# Patient Record
Sex: MALE | Race: WHITE | NOT HISPANIC OR LATINO | URBAN - NONMETROPOLITAN AREA
[De-identification: names, ages, dates, MRNs, and addresses within clinical notes are randomized per-mention and may not be internally consistent; named-entity substitution may affect disease eponyms.]

---

## 2017-06-01 PROBLEM — Z46.0: Noted: 2018-05-01

## 2017-06-01 PROBLEM — H52.13: Noted: 2017-06-01

## 2017-06-01 PROBLEM — H25.13: Noted: 2017-06-01

## 2019-05-23 ENCOUNTER — IMPORTED ENCOUNTER (OUTPATIENT)
Dept: URBAN - NONMETROPOLITAN AREA CLINIC 1 | Facility: CLINIC | Age: 39
End: 2019-05-23

## 2019-05-23 PROCEDURE — 92015 DETERMINE REFRACTIVE STATE: CPT

## 2019-05-23 PROCEDURE — 92310 CONTACT LENS FITTING OU: CPT

## 2019-05-23 PROCEDURE — 92014 COMPRE OPH EXAM EST PT 1/>: CPT

## 2019-05-23 NOTE — PATIENT DISCUSSION
L wear-CLs fit and center well.-Stressed that patient should not sleep in CL. -Updated CL Rx given. -CL care and precautions given. Myopia-Discussed diagnosis with patient. -Explained that people who are myopic are at a higher risk for developing RD/RT and reviewed associated S&S.-Pt to contact our office if symptoms develop. Old horseshoe tears OD x2-stable monitor-Retina flat 360 with no new breaks tears or heme.-S&S of RD/RT reviewed with pt.-Stressed that pt should contact our office right away with any changes or increase in symptoms. Early Cats OU -stable monitorRTC 1 YR CEE; 's Notes: no new doctor

## 2020-07-08 ENCOUNTER — IMPORTED ENCOUNTER (OUTPATIENT)
Dept: URBAN - NONMETROPOLITAN AREA CLINIC 1 | Facility: CLINIC | Age: 40
End: 2020-07-08

## 2020-07-08 PROBLEM — H52.13: Noted: 2020-07-08

## 2020-07-08 PROBLEM — H25.13: Noted: 2017-06-01

## 2020-07-08 PROBLEM — H25.13: Noted: 2021-07-22

## 2020-07-08 PROBLEM — Z46.0: Noted: 2020-07-08

## 2020-07-08 PROCEDURE — 92015 DETERMINE REFRACTIVE STATE: CPT

## 2020-07-08 PROCEDURE — 92310 CONTACT LENS FITTING OU: CPT

## 2020-07-08 PROCEDURE — 92014 COMPRE OPH EXAM EST PT 1/>: CPT

## 2020-07-08 NOTE — PATIENT DISCUSSION
L wear-CLs fit and center well.-Stressed that patient should not sleep in CL. -Pt given trial -1.50 OU -CL care and precautions given. Myopia-Discussed diagnosis with patient. -Explained that people who are myopic are at a higher risk for developing RD/RT and reviewed associated S&S.-Pt to contact our office if symptoms develop. Old horseshoe tears OD x2-stable monitor-Retina flat 360 with no new breaks tears or heme.-S&S of RD/RT reviewed with pt.-Stressed that pt should contact our office right away with any changes or increase in symptoms. Early Cats OU -stable monitorRTC 1 YR CEE

## 2021-07-22 ENCOUNTER — IMPORTED ENCOUNTER (OUTPATIENT)
Dept: URBAN - NONMETROPOLITAN AREA CLINIC 1 | Facility: CLINIC | Age: 41
End: 2021-07-22

## 2021-07-22 PROCEDURE — 92015 DETERMINE REFRACTIVE STATE: CPT

## 2021-07-22 PROCEDURE — V2520 CONTACT LENS HYDROPHILIC: HCPCS

## 2021-07-22 PROCEDURE — 92014 COMPRE OPH EXAM EST PT 1/>: CPT

## 2021-07-22 PROCEDURE — 92310 CONTACT LENS FITTING OU: CPT

## 2021-07-22 NOTE — PATIENT DISCUSSION
Myopia-Discussed diagnosis with patient. -Explained that people who are myopic are at a higher risk for developing RD/RT and reviewed associated S&S.-Pt to contact our office if symptoms develop. CL wear-CLs fit and center well.-Stressed that patient should not sleep in CL. -CL care and precautions given. Old horseshoe tears OD x2-stable monitor-Retina flat 360 with no new breaks tears or heme.-S&S of RD/RT reviewed with pt.-Stressed that pt should contact our office right away with any changes or increase in symptoms. Early [place] OU -stable monitorRTC 1 YR CEE

## 2022-04-16 ASSESSMENT — TONOMETRY
OD_IOP_MMHG: 12
OS_IOP_MMHG: 14
OD_IOP_MMHG: 13
OS_IOP_MMHG: 13
OD_IOP_MMHG: 13
OS_IOP_MMHG: 13

## 2022-04-16 ASSESSMENT — VISUAL ACUITY
OS_SC: 20/20
OS_SC: 20/20-1
OD_SC: 20/20
OS_SC: 20/20
OD_SC: 20/20
OD_SC: 20/20

## 2022-09-02 ENCOUNTER — ESTABLISHED PATIENT (OUTPATIENT)
Dept: RURAL CLINIC 2 | Facility: CLINIC | Age: 42
End: 2022-09-02

## 2022-09-02 DIAGNOSIS — H33.301: ICD-10-CM

## 2022-09-02 DIAGNOSIS — H52.13: ICD-10-CM

## 2022-09-02 DIAGNOSIS — H25.813: ICD-10-CM

## 2022-09-02 PROCEDURE — 92014 COMPRE OPH EXAM EST PT 1/>: CPT

## 2022-09-02 PROCEDURE — 92310 CONTACT LENS FITTING OU: CPT

## 2022-09-02 PROCEDURE — 92015 DETERMINE REFRACTIVE STATE: CPT

## 2022-09-02 ASSESSMENT — VISUAL ACUITY
OS_CC: 20/20
OD_CC: 20/20

## 2022-09-02 ASSESSMENT — TONOMETRY
OD_IOP_MMHG: 15
OS_IOP_MMHG: 15

## 2023-09-05 ENCOUNTER — ESTABLISHED PATIENT (OUTPATIENT)
Dept: RURAL CLINIC 2 | Facility: CLINIC | Age: 43
End: 2023-09-05

## 2023-09-05 DIAGNOSIS — H25.813: ICD-10-CM

## 2023-09-05 DIAGNOSIS — H52.203: ICD-10-CM

## 2023-09-05 DIAGNOSIS — H52.4: ICD-10-CM

## 2023-09-05 DIAGNOSIS — H52.13: ICD-10-CM

## 2023-09-05 DIAGNOSIS — H33.311: ICD-10-CM

## 2023-09-05 PROCEDURE — 92014 COMPRE OPH EXAM EST PT 1/>: CPT

## 2023-09-05 PROCEDURE — 92310-E CONTACT LENS FITTING ESTABLISH PATIENT

## 2023-09-05 PROCEDURE — 92134 CPTRZ OPH DX IMG PST SGM RTA: CPT

## 2023-09-05 PROCEDURE — 92015 DETERMINE REFRACTIVE STATE: CPT

## 2023-09-05 ASSESSMENT — TONOMETRY
OD_IOP_MMHG: 15
OS_IOP_MMHG: 14

## 2023-09-05 ASSESSMENT — VISUAL ACUITY
OD_CC: 20/20
OS_CC: 20/20

## 2024-11-19 ENCOUNTER — COMPREHENSIVE EXAM (OUTPATIENT)
Dept: RURAL CLINIC 2 | Facility: CLINIC | Age: 44
End: 2024-11-19

## 2024-11-19 DIAGNOSIS — H52.4: ICD-10-CM

## 2024-11-19 DIAGNOSIS — H52.13: ICD-10-CM

## 2024-11-19 DIAGNOSIS — H33.311: ICD-10-CM

## 2024-11-19 DIAGNOSIS — H52.223: ICD-10-CM

## 2024-11-19 PROCEDURE — 92134 CPTRZ OPH DX IMG PST SGM RTA: CPT

## 2024-11-19 PROCEDURE — 92014 COMPRE OPH EXAM EST PT 1/>: CPT

## 2024-11-19 PROCEDURE — 92015 DETERMINE REFRACTIVE STATE: CPT

## 2024-11-19 PROCEDURE — 92310-1 LEVEL 1 SOFT LENS UPDATE
